# Patient Record
Sex: FEMALE | Race: WHITE | Employment: FULL TIME | URBAN - METROPOLITAN AREA
[De-identification: names, ages, dates, MRNs, and addresses within clinical notes are randomized per-mention and may not be internally consistent; named-entity substitution may affect disease eponyms.]

---

## 2017-08-29 ENCOUNTER — OFFICE VISIT (OUTPATIENT)
Dept: FAMILY MEDICINE CLINIC | Age: 27
End: 2017-08-29

## 2017-08-29 VITALS
RESPIRATION RATE: 12 BRPM | SYSTOLIC BLOOD PRESSURE: 128 MMHG | HEART RATE: 91 BPM | BODY MASS INDEX: 25.65 KG/M2 | HEIGHT: 64 IN | DIASTOLIC BLOOD PRESSURE: 70 MMHG | WEIGHT: 150.25 LBS

## 2017-08-29 DIAGNOSIS — D22.9 CHANGE IN MOLE: ICD-10-CM

## 2017-08-29 DIAGNOSIS — F41.0 PANIC DISORDER: Primary | ICD-10-CM

## 2017-08-29 PROCEDURE — 99213 OFFICE O/P EST LOW 20 MIN: CPT | Performed by: FAMILY MEDICINE

## 2017-08-29 RX ORDER — ALPRAZOLAM 0.25 MG/1
TABLET ORAL
Qty: 90 TABLET | Refills: 2 | Status: SHIPPED | OUTPATIENT
Start: 2017-08-29

## 2024-11-11 ENCOUNTER — OFFICE VISIT (OUTPATIENT)
Dept: FAMILY MEDICINE CLINIC | Age: 34
End: 2024-11-11

## 2024-11-11 VITALS
BODY MASS INDEX: 24.98 KG/M2 | HEART RATE: 76 BPM | WEIGHT: 143.25 LBS | SYSTOLIC BLOOD PRESSURE: 126 MMHG | DIASTOLIC BLOOD PRESSURE: 76 MMHG | OXYGEN SATURATION: 99 % | TEMPERATURE: 97.4 F | RESPIRATION RATE: 12 BRPM

## 2024-11-11 DIAGNOSIS — Z00.00 WELL ADULT EXAM: ICD-10-CM

## 2024-11-11 DIAGNOSIS — F41.9 ANXIETY: Primary | ICD-10-CM

## 2024-11-11 DIAGNOSIS — Z84.81 FAMILY HISTORY OF BREAST CANCER GENE MUTATION IN FIRST DEGREE RELATIVE: ICD-10-CM

## 2024-11-11 DIAGNOSIS — D22.9 ATYPICAL MOLE: ICD-10-CM

## 2024-11-11 RX ORDER — HYDROXYZINE HYDROCHLORIDE 25 MG/1
25 TABLET, FILM COATED ORAL EVERY 8 HOURS PRN
Qty: 60 TABLET | Refills: 2 | Status: SHIPPED | OUTPATIENT
Start: 2024-11-11

## 2024-11-11 SDOH — ECONOMIC STABILITY: FOOD INSECURITY: WITHIN THE PAST 12 MONTHS, THE FOOD YOU BOUGHT JUST DIDN'T LAST AND YOU DIDN'T HAVE MONEY TO GET MORE.: NEVER TRUE

## 2024-11-11 SDOH — ECONOMIC STABILITY: FOOD INSECURITY: WITHIN THE PAST 12 MONTHS, YOU WORRIED THAT YOUR FOOD WOULD RUN OUT BEFORE YOU GOT MONEY TO BUY MORE.: NEVER TRUE

## 2024-11-11 SDOH — ECONOMIC STABILITY: INCOME INSECURITY: HOW HARD IS IT FOR YOU TO PAY FOR THE VERY BASICS LIKE FOOD, HOUSING, MEDICAL CARE, AND HEATING?: NOT HARD AT ALL

## 2024-11-11 ASSESSMENT — PATIENT HEALTH QUESTIONNAIRE - PHQ9
SUM OF ALL RESPONSES TO PHQ QUESTIONS 1-9: 0
SUM OF ALL RESPONSES TO PHQ QUESTIONS 1-9: 0
1. LITTLE INTEREST OR PLEASURE IN DOING THINGS: NOT AT ALL
SUM OF ALL RESPONSES TO PHQ QUESTIONS 1-9: 0
SUM OF ALL RESPONSES TO PHQ QUESTIONS 1-9: 0
2. FEELING DOWN, DEPRESSED OR HOPELESS: NOT AT ALL
SUM OF ALL RESPONSES TO PHQ9 QUESTIONS 1 & 2: 0

## 2024-11-11 NOTE — PROGRESS NOTES
Subjective   Patient ID: Melissa Peña is a 34 y.o. female.  CC: Patient presents for acute medical problem-increasing anxiety, moles and family history of breast cancer.. Medical assistant notes reviewed.    HPI Pt is here due to having a lot of anxiety and also has mole on her back.  Patient has not been evaluated in the office for over 7 years.  At that point in time she had panic anxiety symptoms and was on Zoloft for a while and then went off it was just taking Xanax on an intermittent basis.  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  Her mom was diagnosed with breast cancer and patient had BRCA gene testing performed which apparently was positive.  She has an appoint set up with [facility] to review these.  Patient continues to be a smoker of a pack of cigarettes per day.  She feels he has a lot of panic and anxiety symptoms.  She has difficulty sometimes sleeping at nighttime although she does follow good rules of sleep hygiene.  She does not use any caffeine except early in the day.  She did try medical marijuana which she has not been using.  She denies any depression symptoms.  There is a family history of thyroid disorder.  Patient also feels very tired most of the time.  She feels she sleeps soundly but even after extended sleep she still feels tired.  There is no one in her house to tell her how she is sleeping.    Review of Systems     Patient Active Problem List   Diagnosis    Panic disorder       No outpatient medications have been marked as taking for the 11/11/24 encounter (Office Visit) with Stalin Coburn MD.       Allergies   Allergen Reactions    Amoxicillin Hives    Codeine Nausea And Vomiting       Social History     Tobacco Use    Smoking status: Every Day     Current packs/day: 1.00     Average packs/day: 0.8 packs/day for 14.9 years (11.4 ttl pk-yrs)     Types: Cigarettes     Start date: 2017    Smokeless tobacco: Never

## 2024-11-19 ENCOUNTER — PROCEDURE VISIT (OUTPATIENT)
Dept: FAMILY MEDICINE CLINIC | Age: 34
End: 2024-11-19

## 2024-11-19 VITALS
SYSTOLIC BLOOD PRESSURE: 120 MMHG | HEART RATE: 72 BPM | DIASTOLIC BLOOD PRESSURE: 84 MMHG | OXYGEN SATURATION: 99 % | TEMPERATURE: 97 F | RESPIRATION RATE: 12 BRPM

## 2024-11-19 DIAGNOSIS — D22.9 ATYPICAL MOLE: Primary | ICD-10-CM

## 2024-11-19 RX ORDER — HYDROXYZINE HYDROCHLORIDE 10 MG/1
10 TABLET, FILM COATED ORAL 3 TIMES DAILY PRN
Qty: 60 TABLET | Refills: 2 | Status: SHIPPED | OUTPATIENT
Start: 2024-11-19

## 2024-11-20 NOTE — PROGRESS NOTES
Excisional Biopsy Procedure Note    Pre-operative Diagnosis: Suspicious lesion    Post-operative Diagnosis: same    Locations:left  shoulder blade    Anesthesia: Lidocaine 1% with epinephrine    Procedure Details     Patient informed of the risks (including bleeding and infection) and benefits of the   procedure and Verbal informed consent obtained. The lesion and surrounding area was given a sterile prep using chloraprep and draped in the usual sterile fashion. The skin was then stretched perpendicular to the skin tension lines and the lesion removed using the 4 mm punch.    Sterile pressure dressing applied. The specimen was sent for pathologic examination. The patient tolerated the procedure well.    EBL: minimal    Condition:  Stable    Complications:  none.    Plan:  1. Instructed to keep the wound dry and covered for 24-48h and clean thereafter.  No showering for 24 hours.  2. Warning signs of infection were reviewed.    3. Recommended that the patient use OTC analgesics as needed for pain.   4. RTC 7-10 days for suture removal

## 2024-12-03 ENCOUNTER — TELEPHONE (OUTPATIENT)
Dept: SURGERY | Age: 34
End: 2024-12-03

## 2024-12-03 NOTE — TELEPHONE ENCOUNTER
Spoke to patient and confirmed appointment for 24 at 8:15 am at our [place] office, also collected intake.      Menstrual History:  Menarche age: 12    Age first live birth: N/A  Breastfeed: N/A  Premenopausal      Oral contraceptive use: history from age [age] not a current user  Hormone replacement therapy use: denies    Breast density: Heterogeneously dense scattered fibroglandular density entirely fatty extremely dense   Ashken[employer] Heritage: no   Genetic testing: Yes [place] 24 BRIP1 gene mutation  Mother  BRIP1 gene mutation    Family history significant for breast and ovarian cancer:   Mother Breast Cancer age of dx 68  Father Prostate Cancer age of dx 65  Paternal Grandfather Prostate Cancer age of dx ?  Paternal family members with Colon Cancer      Lifetime risk for breast cancer 27.6%      Diet: regular  Alcohol: social  Exercise: yes  Sleep: 8 hours  Caffeine: daily 1 cup coffee  Nicotine: current smoker since she was 18 1 pack daily  VAPE: denies  DRUGS: denies

## 2025-02-26 ENCOUNTER — OFFICE VISIT (OUTPATIENT)
Dept: SURGERY | Age: 35
End: 2025-02-26
Payer: COMMERCIAL

## 2025-02-26 VITALS — WEIGHT: 151.8 LBS | BODY MASS INDEX: 25.92 KG/M2 | HEIGHT: 64 IN | RESPIRATION RATE: 18 BRPM

## 2025-02-26 DIAGNOSIS — Z15.89 BRIP1 GENE MUTATION POSITIVE: ICD-10-CM

## 2025-02-26 DIAGNOSIS — Z12.39 BREAST CANCER SCREENING, HIGH RISK PATIENT: ICD-10-CM

## 2025-02-26 DIAGNOSIS — Z91.89 HIGH RISK OF OVARIAN CANCER: ICD-10-CM

## 2025-02-26 DIAGNOSIS — Z12.31 ENCOUNTER FOR SCREENING MAMMOGRAM FOR BREAST CANCER: ICD-10-CM

## 2025-02-26 DIAGNOSIS — Z91.89 AT HIGH RISK FOR BREAST CANCER: Primary | ICD-10-CM

## 2025-02-26 DIAGNOSIS — Z80.3 FAMILY HISTORY OF BREAST CANCER: ICD-10-CM

## 2025-02-26 DIAGNOSIS — Z12.39 ENCOUNTER FOR SCREENING BREAST EXAMINATION: ICD-10-CM

## 2025-02-26 PROCEDURE — 99205 OFFICE O/P NEW HI 60 MIN: CPT | Performed by: NURSE PRACTITIONER

## 2025-02-26 NOTE — PROGRESS NOTES
Bilateral nipple piercings present       Right: No new masses or changes in breast contour.  No skin changes of the breast or nipple areolar complex.  No nipple inversion or discharge. No erythema, thickening (peau d'orange), or dimpling.        Left: No new masses or changes in breast contour.  No skin changes of the breast or nipple areolar complex.  No nipple inversion or discharge. No erythema, thickening (peau d'orange), or dimpling.   There is no axillary lymphadenopathy palpated bilaterally.   Neck: Neck supple. No tracheal deviation present. No obvious mass.  Pulmonary: No accessory muscle use.  Respirations non-labored and no wheezing.  Lymphatics: No palpable supraclavicular, cervical, or axillary lymphadenopathy  Skin: No rash noted. No erythema.   Neurologic: alert and oriented.  Extremities: appear well perfused.         Risk assessment using [employer] Breast Cancer Risk Evaluation Tool to evaluate her risk compared to the general population.  Her lifetime risk for breast cancer is 27.6% (general population average 13.3%).        ASSESSMENT:  - High Risk for Breast Cancer based on increased risk profile for breast cancer.  Daniel ([employer]AMEKA) 8.0 risk estimate calculated at 27.6% (>20% is considered high risk).     - Screening Breast Examination - stable breast examination.   No concerning findings suggestive of malignancy at this time.   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    - Increase risk for ovarian cancer due to BRIP1 - 5-15%   - Family History of Breast Cancer - mother (BRIP1+)      PLAN:   - Incidence of BRIP1 genetic mutation and breast cancer is thought to be possibly increased.  Until more is known, breast cancer screening should be based on family history and other risk factors, not on the presence or absence of the BRIP1 variant.  She is considered to be at high risk back on additional factors and family

## 2025-03-25 ENCOUNTER — HOSPITAL ENCOUNTER (OUTPATIENT)
Dept: WOMENS IMAGING | Age: 35
Discharge: HOME | End: 2025-03-25
Payer: COMMERCIAL

## 2025-03-25 VITALS — HEIGHT: 63 IN | BODY MASS INDEX: 25.69 KG/M2 | WEIGHT: 145 LBS

## 2025-03-25 DIAGNOSIS — Z12.39 BREAST CANCER SCREENING, HIGH RISK PATIENT: ICD-10-CM

## 2025-03-25 DIAGNOSIS — Z80.3 FAMILY HISTORY OF BREAST CANCER: ICD-10-CM

## 2025-03-25 DIAGNOSIS — Z91.89 AT HIGH RISK FOR BREAST CANCER: ICD-10-CM

## 2025-03-25 DIAGNOSIS — Z12.31 ENCOUNTER FOR SCREENING MAMMOGRAM FOR BREAST CANCER: ICD-10-CM

## 2025-03-25 DIAGNOSIS — Z15.89 BRIP1 GENE MUTATION POSITIVE: ICD-10-CM

## 2025-03-25 PROCEDURE — 77067 SCR MAMMO BI INCL CAD: CPT

## 2025-03-26 ENCOUNTER — TELEPHONE (OUTPATIENT)
Dept: WOMENS IMAGING | Age: 35
End: 2025-03-26

## 2025-03-26 NOTE — TELEPHONE ENCOUNTER
Reaching out to discuss mammogram results and schedule additional breast imaging. Patient mailbox is full and am unable to LVM

## 2025-03-31 ENCOUNTER — RESULTS FOLLOW-UP (OUTPATIENT)
Dept: SURGERY | Age: 35
End: 2025-03-31

## 2025-04-01 DIAGNOSIS — R92.8 ABNORMAL FINDING ON BREAST IMAGING: Primary | ICD-10-CM

## 2025-04-08 ENCOUNTER — HOSPITAL ENCOUNTER (OUTPATIENT)
Dept: ULTRASOUND IMAGING | Age: 35
Discharge: HOME | End: 2025-04-08
Payer: COMMERCIAL

## 2025-04-08 DIAGNOSIS — R92.8 ABNORMAL FINDING ON BREAST IMAGING: ICD-10-CM

## 2025-04-08 PROCEDURE — 76641 ULTRASOUND BREAST COMPLETE: CPT

## 2025-04-10 ENCOUNTER — RESULTS FOLLOW-UP (OUTPATIENT)
Dept: SURGERY | Age: 35
End: 2025-04-10

## 2025-08-11 ENCOUNTER — HOSPITAL ENCOUNTER (OUTPATIENT)
Dept: MRI IMAGING | Age: 35
Discharge: HOME | End: 2025-08-11
Payer: COMMERCIAL

## 2025-08-11 ENCOUNTER — HOSPITAL ENCOUNTER (OUTPATIENT)
Dept: ULTRASOUND IMAGING | Age: 35
End: 2025-08-11
Payer: COMMERCIAL

## 2025-08-11 DIAGNOSIS — Z80.3 FAMILY HISTORY OF BREAST CANCER: ICD-10-CM

## 2025-08-11 DIAGNOSIS — Z15.89 BRIP1 GENE MUTATION POSITIVE: ICD-10-CM

## 2025-08-11 DIAGNOSIS — Z91.89 AT HIGH RISK FOR BREAST CANCER: ICD-10-CM

## 2025-08-11 DIAGNOSIS — Z12.39 BREAST CANCER SCREENING, HIGH RISK PATIENT: ICD-10-CM

## 2025-08-11 PROCEDURE — 6360000004 HC RX CONTRAST MEDICATION: Performed by: NURSE PRACTITIONER

## 2025-08-11 PROCEDURE — C8908 MRI W/O FOL W/CONT, BREAST,: HCPCS

## 2025-08-11 PROCEDURE — A9577 INJ MULTIHANCE: HCPCS | Performed by: NURSE PRACTITIONER

## 2025-08-11 RX ADMIN — GADOBENATE DIMEGLUMINE 14 ML: 529 INJECTION, SOLUTION INTRAVENOUS at 13:58
